# Patient Record
Sex: MALE | Race: WHITE
[De-identification: names, ages, dates, MRNs, and addresses within clinical notes are randomized per-mention and may not be internally consistent; named-entity substitution may affect disease eponyms.]

---

## 2018-02-16 ENCOUNTER — HOSPITAL ENCOUNTER (OUTPATIENT)
Dept: HOSPITAL 17 - NEPE | Age: 43
Discharge: HOME | End: 2018-02-16
Attending: INTERNAL MEDICINE
Payer: SELF-PAY

## 2018-02-16 VITALS
TEMPERATURE: 99 F | OXYGEN SATURATION: 96 % | HEART RATE: 80 BPM | SYSTOLIC BLOOD PRESSURE: 122 MMHG | RESPIRATION RATE: 15 BRPM | DIASTOLIC BLOOD PRESSURE: 75 MMHG

## 2018-02-16 VITALS
RESPIRATION RATE: 18 BRPM | OXYGEN SATURATION: 96 % | TEMPERATURE: 98.5 F | SYSTOLIC BLOOD PRESSURE: 129 MMHG | DIASTOLIC BLOOD PRESSURE: 82 MMHG | HEART RATE: 79 BPM

## 2018-02-16 VITALS — WEIGHT: 191.8 LBS | HEIGHT: 69 IN | BODY MASS INDEX: 28.41 KG/M2
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
## 2018-02-16 DIAGNOSIS — T18.128A: Primary | ICD-10-CM

## 2018-02-16 DIAGNOSIS — T18.108A: ICD-10-CM

## 2018-02-16 DIAGNOSIS — K22.10: ICD-10-CM

## 2018-02-16 DIAGNOSIS — D72.1: ICD-10-CM

## 2018-02-16 DIAGNOSIS — Z88.5: ICD-10-CM

## 2018-02-16 DIAGNOSIS — K21.9: ICD-10-CM

## 2018-02-16 PROCEDURE — 96374 THER/PROPH/DIAG INJ IV PUSH: CPT

## 2018-02-16 PROCEDURE — 96372 THER/PROPH/DIAG INJ SC/IM: CPT

## 2018-02-16 PROCEDURE — 43247 EGD REMOVE FOREIGN BODY: CPT

## 2018-02-16 PROCEDURE — 43239 EGD BIOPSY SINGLE/MULTIPLE: CPT

## 2018-02-16 PROCEDURE — 88305 TISSUE EXAM BY PATHOLOGIST: CPT

## 2018-02-16 PROCEDURE — 99282 EMERGENCY DEPT VISIT SF MDM: CPT

## 2018-02-16 PROCEDURE — 00731 ANES UPR GI NDSC PX NOS: CPT

## 2018-02-16 NOTE — PD.CONS
HPI


History of Present Illness


This is a 42 year old male who presents to the [facility] emergency room with 

complaints of food bolus impaction the patient reports prior episodes that he 

was able to spontaneously disimpact he also reports a long-standing history of 

heartburn reflux for which he took over-the-counter Nexium for a few weeks at a 

time otherwise he's a good health and has no other complaints





PFSH


Past Medical History


Reflux


Past Surgical History


None


Coded Allergies:  


     codeine (Verified  Allergy, Unknown, 2/16/18)


Medications


Occasional Nexium


Family History


Noncontributory


Social History


Social alcohol but no tobacco





Review of Systems


Review of systems


Patient denies any headache dizziness blurry vision, 


denies any chest pain shortness of breath cough fever chills,


Denies any palpitations or fatigue


denies any polyuria dysuria hematuria, 


denies any numbness tingling or weakness, 


denies any skin rash pruritus or jaundice, 


denies any easy bruising or bleeding tendency, 


denies any recent change in mood





GI Exam


Vitals I&O





Vital Signs








  Date Time  Temp Pulse Resp B/P (MAP) Pulse Ox O2 Delivery O2 Flow Rate FiO2


 


2/16/18 09:30   15     


 


2/16/18 09:29 99.0 80 15 122/75 (91) 96   








Physical Examination


HEENT: Pupils round and reactive to light; normocephalic; atraumatic; no 

jaundice.  Throat is clear.


NECK: Neck is supple, no JVD, no lymphadenopathy.


CHEST:  Chest is clear to auscultation and percussion.


CARDIAC:  Regular rate and rhythm with no murmur gallop or rubs.


ABDOMEN:  Soft, nondistended, nontender; no hepatosplenomegaly; bowel sounds 

are present in all four quadrants.


EXTREMITIES: No clubbing, cyanosis, or edema.


SKIN:  Normal; no rash; no jaundice.


CNS:  No focal deficits; alert and oriented times three.





Assessment and Plan


Plan


Food bolus impaction


History of esophageal reflux





We will proceed with an upper endoscopy


Further recommendations shall depend on the findings











Kyrie Padilla MD Feb 16, 2018 13:01

## 2018-02-16 NOTE — PD.PROCEDR
GI Procedure





PROCEDURE PERFORMED


EGD with foreign body removal and biopsy





INDICATION FOR PROCEDURE


Foreign body in the esophagus





PROCEDURE:


The procedure, risks and benefits were discussed with Mr. Grant and informed


consent was obtained.  Anesthesia sedated him with Diprivan.  He was placed in 

the left lateral decubitus position.





EGD:


The Pentax videoscope was introduced through the oropharynx and advanced to the 

second portion of the duodenum under direct visualization. Retroflexion was 

performed in the stomach.





FINDINGS:


The esophagus there was a piece of meat stuck in the distal esophagus this was 

removed using a Euceda net post removal noted that there is some erythema at the 

lower end of the esophagus with 2 superficial ulcerations this area was 

biopsied the rest of the esophagus was unremarkable


The stomach this too appeared to be unremarkable and within normal limits


The duodenum was also normal





ESTIMATED BLOOD LOSS:


None





SPECIMENS REMOVED:


Esophageal biopsy





COMPLICATIONS:


None





IMPRESSION:


Foreign body removal from the esophagus


Esophagitis distal esophagus


Esophageal ulcer distal esophagus





PLAN:


Await biopsy


Chew food well


Pantoprazole 40 mg daily


Follow-up in clinic in 3 weeks


Repeat EGD with possible dilation in 2 months











Kyrie Padilla MD Feb 16, 2018 13:15

## 2018-02-16 NOTE — PD
HPI


Chief Complaint:  Foreign Body


Time Seen by Provider:  09:30


Travel History


International Travel<30 days:  No


Contact w/Intl Traveler<30days:  No


Traveled to known affect area:  No





History of Present Illness


HPI


42-year-old male sent here from [place] emergency department for GI evaluation 

for esophageal impaction.  Patient reports history of reflux.  States he ate 1 

bite of steak at around 7:00 PM yesterday evening, and since that time he has 

been unable to swallow or tolerate his secretions.  He was evaluated by the 

emergency department at [place] and was provided glucagon without improvement 

in symptoms.  Gastroenterology was consulted, and the patient was transported 

here for upper endoscopy.  He denies chest pain or dyspnea.  No abdominal pain.





PFSH


Past Medical History


Diminished Hearing:  No


GERD:  Yes


Immunizations Current:  Yes





Past Surgical History


Surgical History:  No Previous Surgery





Social History


Alcohol Use:  Yes (socially)


Tobacco Use:  No


Substance Use:  No





Allergies-Medications


(Allergen,Severity, Reaction):  


Coded Allergies:  


     codeine (Verified  Allergy, Unknown, 2/16/18)


Reported Meds & Prescriptions





Reported Meds & Active Scripts


Active


No Active Prescriptions or Reported Medications    








Review of Systems


Except as stated in HPI:  all other systems reviewed are Neg





Physical Exam


Narrative


GENERAL: Well-developed, well-nourished, no apparent distress, spitting 

secretions into an emesis bag.


SKIN: Focused skin assessment warm/dry.


HEAD: Atraumatic. Normocephalic. 


EYES: Pupils equal and round. No scleral icterus. No injection or drainage. 


ENT: Mucous membranes pink and moist.


NECK: Trachea midline. No JVD. 


CARDIOVASCULAR: Regular rate and rhythm.  No murmur appreciated.


RESPIRATORY: No accessory muscle use. Clear to auscultation. Breath sounds 

equal bilaterally. 


GASTROINTESTINAL: Abdomen soft, non-tender, nondistended. 


MUSCULOSKELETAL: No obvious deformities. No clubbing.  No cyanosis.  No edema. 


NEUROLOGICAL: Awake and alert. No obvious cranial nerve deficits.  Motor 

grossly within normal limits. Normal speech.


PSYCHIATRIC: Appropriate mood and affect; insight and judgment normal.





Data


Data


Last Documented VS





Vital Signs








  Date Time  Temp Pulse Resp B/P (MAP) Pulse Ox O2 Delivery O2 Flow Rate FiO2


 


2/16/18 09:30   15     


 


2/16/18 09:29 99.0 80  122/75 (91) 96   











MDM


Medical Decision Making


Medical Screen Exam Complete:  Yes


Emergency Medical Condition:  Yes


Differential Diagnosis


Esophageal foreign body, stricture, mass


Narrative Course


11:10 AM: Case discussed with on-call gastroenterologist Dr. Padilla who plans 

for upper endoscopy today.





Diagnosis





 Primary Impression:  


 Esophageal foreign body


 Qualified Codes:  T18.108A - Unspecified foreign body in esophagus causing 

other injury, initial encounter


Scripts


No Active Prescriptions or Reported Meds











Jay Schmidt MD Feb 16, 2018 10:32